# Patient Record
Sex: MALE | Race: BLACK OR AFRICAN AMERICAN | NOT HISPANIC OR LATINO | URBAN - METROPOLITAN AREA
[De-identification: names, ages, dates, MRNs, and addresses within clinical notes are randomized per-mention and may not be internally consistent; named-entity substitution may affect disease eponyms.]

---

## 2021-01-15 ENCOUNTER — EMERGENCY (EMERGENCY)
Facility: HOSPITAL | Age: 46
LOS: 1 days | Discharge: HOME | End: 2021-01-15
Attending: EMERGENCY MEDICINE | Admitting: EMERGENCY MEDICINE
Payer: OTHER MISCELLANEOUS

## 2021-01-15 VITALS
TEMPERATURE: 99 F | DIASTOLIC BLOOD PRESSURE: 90 MMHG | WEIGHT: 214.95 LBS | OXYGEN SATURATION: 98 % | SYSTOLIC BLOOD PRESSURE: 160 MMHG | HEIGHT: 71 IN | HEART RATE: 106 BPM | RESPIRATION RATE: 18 BRPM
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
## 2021-01-15 DIAGNOSIS — S22.41XA MULTIPLE FRACTURES OF RIBS, RIGHT SIDE, INITIAL ENCOUNTER FOR CLOSED FRACTURE: ICD-10-CM
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
## 2021-01-15 DIAGNOSIS — R06.02 SHORTNESS OF BREATH: ICD-10-CM
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
## 2021-01-15 PROCEDURE — 93010 ELECTROCARDIOGRAM REPORT: CPT

## 2021-01-15 PROCEDURE — 99284 EMERGENCY DEPT VISIT MOD MDM: CPT

## 2021-01-15 PROCEDURE — 71111 X-RAY EXAM RIBS/CHEST4/> VWS: CPT | Mod: 26

## 2021-01-15 RX ORDER — OXYCODONE AND ACETAMINOPHEN 5; 325 MG/1; MG/1
1 TABLET ORAL
Qty: 8 | Refills: 0
Start: 2021-01-15 | End: 2021-01-16

## 2021-01-15 RX ORDER — OXYCODONE AND ACETAMINOPHEN 5; 325 MG/1; MG/1
1 TABLET ORAL ONCE
Refills: 0 | Status: DISCONTINUED | OUTPATIENT
Start: 2021-01-15 | End: 2021-01-15

## 2021-01-15 RX ORDER — LIDOCAINE 4 G/100G
1 CREAM TOPICAL ONCE
Refills: 0 | Status: COMPLETED | OUTPATIENT
Start: 2021-01-15 | End: 2021-01-15

## 2021-01-15 RX ADMIN — LIDOCAINE 1 PATCH: 4 CREAM TOPICAL at 17:38

## 2021-01-15 RX ADMIN — OXYCODONE AND ACETAMINOPHEN 1 TABLET(S): 5; 325 TABLET ORAL at 17:38

## 2021-01-15 NOTE — ED ADULT TRIAGE NOTE - CHIEF COMPLAINT QUOTE
Patient to ED stating, "I feel like I am hyperventilating and cannot catch my breath".  Denies CP, O2 saturation 98% and in no respiratory distress

## 2021-01-15 NOTE — ED PROVIDER NOTE - OBJECTIVE STATEMENT
44 y/o M with no PMHx presents to the ED for SOB. This morning, Pt reports he fell off of a hydraulic lift that was about 4 feet off the ground. He landed on the R side of his chest with subsequent CP. He went to urgent care where he had CT scan imaging done that showed a Fx to the R 5th and 6th ribs (non-displaced). There were no signs of PTX, hemothorax, contusion, or consolidation. He was given Motrin and Tylenol and sent home on Flexeril. He was also given an incentive spirometer. He came to the ED later today for worsening pain to the area and difficulty breathing described as pain with deep breaths. Pt denies fevers, chills, N/V/D, coughs, recent travels, and known sick contacts.

## 2021-01-15 NOTE — ED PROVIDER NOTE - MUSCULOSKELETAL, MLM
Lateral chest wall bruising and tenderness to the R 5th and 6th ribs. No facial or head trauma. No point tenderness or step-offs.

## 2021-01-15 NOTE — ED PROVIDER NOTE - CONSTITUTIONAL, MLM
normal... Mild distress secondary to pain;, Awake, alert, oriented to person, place, time/situation.

## 2021-01-15 NOTE — ED PROVIDER NOTE - CLINICAL SUMMARY MEDICAL DECISION MAKING FREE TEXT BOX
44 y/o M presenting with R sided chest wall pain after diagnosis of a Fx earlier this morning. Low heart score. Pain is likely from traumatic injury. Will order repeat CXR to r/o PTX and pleural effusion. Discussed importance of incentive spirometry use. Will give Percocet here in the ED and lidocaine patch for adequate pain control. Will obtain screening EKG to r/o signs of ischemia. Will likely D/C with follow up to thoracic surgery and PCP.

## 2021-01-15 NOTE — ED PROVIDER NOTE - PATIENT PORTAL LINK FT
You can access the FollowMyHealth Patient Portal offered by [facility] by registering at the following website: [email or web address] By joining [facility]’s FollowMyHealth portal, you will also be able to view your health information using other applications (apps) compatible with our system.
